# Patient Record
Sex: FEMALE | Race: WHITE | Employment: UNEMPLOYED | URBAN - NONMETROPOLITAN AREA
[De-identification: names, ages, dates, MRNs, and addresses within clinical notes are randomized per-mention and may not be internally consistent; named-entity substitution may affect disease eponyms.]

---
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
## 2024-01-24 ENCOUNTER — OFFICE VISIT (OUTPATIENT)
Age: 19
End: 2024-01-24

## 2024-01-24 VITALS
TEMPERATURE: 97.7 F | DIASTOLIC BLOOD PRESSURE: 58 MMHG | BODY MASS INDEX: 22.31 KG/M2 | WEIGHT: 130 LBS | HEART RATE: 73 BPM | OXYGEN SATURATION: 99 % | SYSTOLIC BLOOD PRESSURE: 117 MMHG

## 2024-01-24 DIAGNOSIS — Z02.1 PHYSICAL EXAM, PRE-EMPLOYMENT: Primary | ICD-10-CM

## 2024-01-24 PROCEDURE — 99999 PR OFFICE/OUTPT VISIT,PROCEDURE ONLY: CPT | Performed by: NURSE PRACTITIONER

## 2024-01-24 NOTE — PROGRESS NOTES
[facility] PHYSICIANS [facility], [facility]  MARRY QUIROZ [address] [id number]  Dept: [phone]    HPI:   Pre-participation exam for care giver at [facility] - no complaints. No medications; vaccines are up to date per patient.     No current outpatient medications on file.     No current facility-administered medications for this visit.     Allergies   Allergen Reactions    Penicillins        PAST MEDICAL HISTORY   No past medical history on file.    SURGICAL HISTORY        Procedure Laterality Date    BRAIN SURGERY      sinus tumor       FAMILY HISTORY    No family history on file.      Review of Systems:  Respiratory: Negative for shortness of breath or wheezing.   Cardiovascular: Negative for chest pain or palpitations.   Musculoskeletal: Negative for back pain, joint swelling and arthralgias.   Neurological: Negative for headaches. No history of concussion.    No history of SOB/CP/dizziness with activity. No fainting with activity. No family history of sudden death or heart attack before age 55.        Objective:      BP (!) 117/58 (Site: Right Upper Arm, Position: Sitting)   Pulse 73   Temp 97.7 °F (36.5 °C)   Wt 59 kg (130 lb)   SpO2 99%   BMI 22.31 kg/m²     Physical Exam:   Constitutional: Alejandra Alberts appears well-developed and well-nourished.  TM's: normal bilaterally  Nose: No nasal discharge.   Mouth/Throat: Mucous membranes are moist. Oropharynx is clear. Pharynx is normal.   Eyes: EOM are normal. Pupils are equal, round, and reactive to light.   Neck: Thyroid normal. No adenopathy.   Cardiovascular: Regular rhythm, nl S1 and S2. No murmur heard. Pulses symmetric.  Pulmonary/Chest: Breath sounds normal, no wheeze.   Abdomen: No mass or tenderness. BS normal.  Spine: No scoliosis.   Musc: 5/5 strength in UE and LE bilaterally; duck walk normal. Toe walking and heel walking normal.    Assessment:      Normal physical

## 2024-01-29 ENCOUNTER — HOSPITAL ENCOUNTER (EMERGENCY)
Age: 19
Discharge: HOME | End: 2024-01-29
Attending: STUDENT IN AN ORGANIZED HEALTH CARE EDUCATION/TRAINING PROGRAM
Payer: COMMERCIAL

## 2024-01-29 VITALS
OXYGEN SATURATION: 99 % | SYSTOLIC BLOOD PRESSURE: 107 MMHG | DIASTOLIC BLOOD PRESSURE: 53 MMHG | TEMPERATURE: 97.4 F | HEIGHT: 64 IN | WEIGHT: 128 LBS | RESPIRATION RATE: 16 BRPM | BODY MASS INDEX: 21.85 KG/M2 | HEART RATE: 90 BPM

## 2024-01-29 DIAGNOSIS — R07.89 ATYPICAL CHEST PAIN: Primary | ICD-10-CM

## 2024-01-29 LAB
ANION GAP SERPL CALCULATED.3IONS-SCNC: 11 MMOL/L (ref 9–17)
BASOPHILS # BLD: 0.05 K/UL (ref 0–0.2)
BASOPHILS NFR BLD: 0 % (ref 0–2)
BUN SERPL-MCNC: 15 MG/DL (ref 6–20)
BUN/CREAT SERPL: 19 (ref 9–20)
CALCIUM SERPL-MCNC: 9.5 MG/DL (ref 8.6–10.4)
CHLORIDE SERPL-SCNC: 101 MMOL/L (ref 98–107)
CK SERPL-CCNC: 172 U/L (ref 26–192)
CO2 SERPL-SCNC: 26 MMOL/L (ref 20–31)
CREAT SERPL-MCNC: 0.8 MG/DL (ref 0.5–0.9)
EKG ATRIAL RATE: 90 BPM
EKG P AXIS: 57 DEGREES
EKG P-R INTERVAL: 124 MS
EKG Q-T INTERVAL: 350 MS
EKG QRS DURATION: 84 MS
EKG QTC CALCULATION (BAZETT): 428 MS
EKG R AXIS: 62 DEGREES
EKG T AXIS: 32 DEGREES
EKG VENTRICULAR RATE: 90 BPM
EOSINOPHIL # BLD: 0.03 K/UL (ref 0–0.44)
EOSINOPHILS RELATIVE PERCENT: 0 % (ref 1–4)
ERYTHROCYTE [DISTWIDTH] IN BLOOD BY AUTOMATED COUNT: 12.6 % (ref 11.8–14.4)
GFR SERPL CREATININE-BSD FRML MDRD: >60 ML/MIN/1.73M2
GLUCOSE SERPL-MCNC: 94 MG/DL (ref 70–99)
HCT VFR BLD AUTO: 36 % (ref 36.3–47.1)
HGB BLD-MCNC: 12.2 G/DL (ref 11.9–15.1)
IMM GRANULOCYTES # BLD AUTO: 0.05 K/UL (ref 0–0.3)
IMM GRANULOCYTES NFR BLD: 0 %
LYMPHOCYTES NFR BLD: 2.87 K/UL (ref 1.2–5.2)
LYMPHOCYTES RELATIVE PERCENT: 24 % (ref 25–45)
MCH RBC QN AUTO: 30.9 PG (ref 25.2–33.5)
MCHC RBC AUTO-ENTMCNC: 33.9 G/DL (ref 28.4–34.8)
MCV RBC AUTO: 91.1 FL (ref 82.6–102.9)
MONOCYTES NFR BLD: 0.89 K/UL (ref 0.1–1.4)
MONOCYTES NFR BLD: 7 % (ref 2–8)
MYOGLOBIN SERPL-MCNC: 91 NG/ML (ref 25–58)
NEUTROPHILS NFR BLD: 69 % (ref 34–64)
NEUTS SEG NFR BLD: 8.14 K/UL (ref 1.8–8)
NRBC BLD-RTO: 0 PER 100 WBC
PLATELET # BLD AUTO: 273 K/UL (ref 138–453)
PMV BLD AUTO: 9.7 FL (ref 8.1–13.5)
POTASSIUM SERPL-SCNC: 4.1 MMOL/L (ref 3.7–5.3)
RBC # BLD AUTO: 3.95 M/UL (ref 3.95–5.11)
SODIUM SERPL-SCNC: 138 MMOL/L (ref 135–144)
TROPONIN I SERPL HS-MCNC: 8 NG/L (ref 0–14)
WBC OTHER # BLD: 12 K/UL (ref 4.5–13.5)

## 2024-01-29 PROCEDURE — 99284 EMERGENCY DEPT VISIT MOD MDM: CPT

## 2024-01-29 PROCEDURE — 93005 ELECTROCARDIOGRAM TRACING: CPT | Performed by: EMERGENCY MEDICINE

## 2024-01-29 PROCEDURE — 93010 ELECTROCARDIOGRAM REPORT: CPT | Performed by: INTERNAL MEDICINE

## 2024-01-29 PROCEDURE — 85025 COMPLETE CBC W/AUTO DIFF WBC: CPT

## 2024-01-29 PROCEDURE — 82550 ASSAY OF CK (CPK): CPT

## 2024-01-29 PROCEDURE — 84484 ASSAY OF TROPONIN QUANT: CPT

## 2024-01-29 PROCEDURE — 36415 COLL VENOUS BLD VENIPUNCTURE: CPT

## 2024-01-29 PROCEDURE — 80048 BASIC METABOLIC PNL TOTAL CA: CPT

## 2024-01-29 PROCEDURE — 83874 ASSAY OF MYOGLOBIN: CPT

## 2024-01-29 ASSESSMENT — ENCOUNTER SYMPTOMS
CHOKING: 0
BACK PAIN: 0
NAUSEA: 0
VOMITING: 0
EYES NEGATIVE: 1
CHEST TIGHTNESS: 1
WHEEZING: 0
ABDOMINAL DISTENTION: 0
COUGH: 0

## 2024-01-29 ASSESSMENT — PAIN - FUNCTIONAL ASSESSMENT: PAIN_FUNCTIONAL_ASSESSMENT: NONE - DENIES PAIN

## 2024-01-29 ASSESSMENT — LIFESTYLE VARIABLES
HOW OFTEN DO YOU HAVE A DRINK CONTAINING ALCOHOL: NEVER
HOW MANY STANDARD DRINKS CONTAINING ALCOHOL DO YOU HAVE ON A TYPICAL DAY: PATIENT DOES NOT DRINK

## 2024-01-30 ENCOUNTER — TELEPHONE (OUTPATIENT)
Dept: CARDIOLOGY | Age: 19
End: 2024-01-30

## 2024-01-30 NOTE — TELEPHONE ENCOUNTER
----- Message from Danielle Ortiz MD sent at 1/29/2024 10:40 PM EST -----        ----- Message -----  From: Bryson Butts MD  Sent: 1/29/2024   8:49 PM EST  To: Danielle Ortiz MD

## 2024-01-30 NOTE — PROGRESS NOTES
Cardiology follow up requested by the ER team.   Angie, can you please call the patient to schedule an appointment? Thank you

## 2024-01-30 NOTE — ED PROVIDER NOTES
[facility] ED  Emergency Department Encounter  Emergency Medicine Attending     Pt Name:Alejandra Alberts  MRN: [id number]  Birthdate 2005  Date of evaluation: 1/29/24  PCP:  Not, On File (Inactive)  Note Started: 7:27 PM EST      CHIEF COMPLAINT       Chief Complaint   Patient presents with    Chest Pain     Intermittent left side chest pain over last 2 weeks, denies current chest pain. Pt states she feels shakiness, palpitations, bilateral leg pain and skin discoloration. States can not get into PCP and wants evaluated by ER with referral to cardiology.        HISTORY OF PRESENT ILLNESS  (Location/Symptom, Timing/Onset, Context/Setting, Quality, Duration, Modifying Factors, Severity.)      Alejandra Alberts is a 18 y.o. female who presents with several different complaints that have all been proceeding experience over the past 2 weeks.  Patient states that she has been having occasional intermittent left-sided chest discomfort that only occasionally turns into a sharp stabbing-like sensation.  Patient was also complained about palpitations, and extremely high heart rate during her workouts as well getting up over 200.  Patient states that she is also been having some intermittent aches in her lower extremities bilaterally and seems that everything seems to be worse in the morning especially after she recovers from her daily work.  Patient denies any nausea, vomiting, shortness of breath, changes in her mentation, changes in her GI, , changes in her diet or any form of recreational or illicit drug use.    PAST MEDICAL / SURGICAL / SOCIAL / FAMILY HISTORY      has no past medical history on file.       has a past surgical history that includes brain surgery.      Social History     Socioeconomic History    Marital status: Single     Spouse name: Not on file    Number of children: Not on file    Years of education: Not on file    Highest education level: Not on file   Occupational History    Not on file

## 2024-02-05 ENCOUNTER — HOSPITAL ENCOUNTER (EMERGENCY)
Age: 19
Discharge: HOME | End: 2024-02-05
Attending: EMERGENCY MEDICINE
Payer: COMMERCIAL

## 2024-02-05 VITALS
OXYGEN SATURATION: 98 % | DIASTOLIC BLOOD PRESSURE: 77 MMHG | SYSTOLIC BLOOD PRESSURE: 127 MMHG | RESPIRATION RATE: 16 BRPM | HEART RATE: 69 BPM | TEMPERATURE: 98.6 F

## 2024-02-05 DIAGNOSIS — Z01.89 VISIT FOR LABORATORY TEST: Primary | ICD-10-CM

## 2024-02-05 LAB — MYOGLOBIN SERPL-MCNC: <21 NG/ML (ref 25–58)

## 2024-02-05 PROCEDURE — 36415 COLL VENOUS BLD VENIPUNCTURE: CPT

## 2024-02-05 PROCEDURE — 99283 EMERGENCY DEPT VISIT LOW MDM: CPT

## 2024-02-05 PROCEDURE — 83874 ASSAY OF MYOGLOBIN: CPT

## 2024-02-05 ASSESSMENT — ENCOUNTER SYMPTOMS
SHORTNESS OF BREATH: 0
SORE THROAT: 0
ABDOMINAL DISTENTION: 0
BACK PAIN: 0

## 2024-02-05 ASSESSMENT — LIFESTYLE VARIABLES
HOW MANY STANDARD DRINKS CONTAINING ALCOHOL DO YOU HAVE ON A TYPICAL DAY: PATIENT DOES NOT DRINK
HOW OFTEN DO YOU HAVE A DRINK CONTAINING ALCOHOL: NEVER

## 2024-02-05 ASSESSMENT — PAIN - FUNCTIONAL ASSESSMENT: PAIN_FUNCTIONAL_ASSESSMENT: NONE - DENIES PAIN

## 2024-02-05 NOTE — ED PROVIDER NOTES
[facility] ED  EMERGENCY DEPARTMENT ENCOUNTER      Pt Name: Alejandra Alberts  MRN: [id number]  Birthdate 2005  Date of evaluation: 2/5/2024  Provider: Tabitha Jeronimo DO    CHIEF COMPLAINT       Chief Complaint   Patient presents with    Abnormal Lab     Pt. 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          HISTORY OF PRESENT ILLNESS   (Location/Symptom, Timing/Onset, Context/Setting, Quality, Duration, Modifying Factors, Severity)  Note limiting factors.   Alejandra Alberts is a 18 y.o. female who presents to the emergency department for repeat of her lab work.  Patient was seen in the emergency department 6 days ago for chest pain and had symptoms concerning with myalgias so her hemoglobin was checked and it was 91.  She is here today requesting repeat hemoglobin level as her  would not allow her to go back to track without having it rechecked.  She states that she went to her doctor's office but they were unable to order an outpatient myoglobin test for her.  Therefore she is here in the emergency department to get it drawn.  Patient otherwise reports no symptoms and seems to be doing great.  Patient states that she feels back to baseline and would like to return to practice but her  would like to have her myoglobin levels repeated before they allow her back to practice.    REVIEW OF SYSTEMS    (2-9 systems for level 4, 10 or more for level 5)     Review of Systems   Constitutional:  Negative for fatigue and fever.   HENT:  Negative for congestion and sore throat.    Eyes:  Negative for visual disturbance.   Respiratory:  Negative for shortness of breath.    Cardiovascular:  Negative for chest pain and palpitations.   Gastrointestinal:  Negative for abdominal distention.   Genitourinary:  Negative for dysuria.   Musculoskeletal:  Negative for back pain.   Skin:  Negative for rash.   Psychiatric/Behavioral:  Negative for suicidal ideas.

## 2024-02-05 NOTE — DISCHARGE INSTRUCTIONS
Make sure to drink plenty of fluids.  Slowly step into your practice and advance as your body can tolerate.

## 2024-05-01 ENCOUNTER — HOSPITAL ENCOUNTER (EMERGENCY)
Age: 19
Discharge: HOME | End: 2024-05-01
Attending: EMERGENCY MEDICINE
Payer: COMMERCIAL

## 2024-05-01 VITALS
DIASTOLIC BLOOD PRESSURE: 84 MMHG | HEART RATE: 67 BPM | RESPIRATION RATE: 16 BRPM | OXYGEN SATURATION: 95 % | TEMPERATURE: 97.1 F | SYSTOLIC BLOOD PRESSURE: 124 MMHG

## 2024-05-01 DIAGNOSIS — K08.89 PAIN, DENTAL: Primary | ICD-10-CM

## 2024-05-01 PROCEDURE — 99282 EMERGENCY DEPT VISIT SF MDM: CPT

## 2024-05-01 RX ORDER — ACETAMINOPHEN 500 MG
1000 TABLET ORAL EVERY 6 HOURS PRN
COMMUNITY

## 2024-05-01 RX ORDER — CLINDAMYCIN HYDROCHLORIDE 300 MG/1
300 CAPSULE ORAL 3 TIMES DAILY
COMMUNITY

## 2024-05-01 RX ORDER — IBUPROFEN 200 MG
1000 TABLET ORAL EVERY 6 HOURS PRN
COMMUNITY

## 2024-05-01 ASSESSMENT — PAIN DESCRIPTION - DESCRIPTORS: DESCRIPTORS: ACHING

## 2024-05-01 ASSESSMENT — PAIN - FUNCTIONAL ASSESSMENT: PAIN_FUNCTIONAL_ASSESSMENT: 0-10

## 2024-05-01 ASSESSMENT — PAIN DESCRIPTION - LOCATION: LOCATION: TEETH

## 2024-05-01 ASSESSMENT — PAIN SCALES - GENERAL: PAINLEVEL_OUTOF10: 8

## 2024-05-01 ASSESSMENT — PAIN DESCRIPTION - PAIN TYPE: TYPE: ACUTE PAIN

## 2024-05-01 ASSESSMENT — PAIN DESCRIPTION - FREQUENCY: FREQUENCY: CONTINUOUS

## 2024-05-01 ASSESSMENT — PAIN DESCRIPTION - ORIENTATION: ORIENTATION: RIGHT;LOWER

## 2024-05-01 NOTE — ED PROVIDER NOTES
[facility]  EMERGENCY DEPARTMENT ENCOUNTER      Pt Name: Alejandra Alberts  MRN: [id number]  Birthdate 2005  Date of evaluation: 5/1/2024  Provider: Raghav Marshall MD    CHIEF COMPLAINT       Chief Complaint   Patient presents with    Dental Pain     Patient states she had a dental infection back in February and she was put on Clindamycin, states she completed the atbx and a few weeks later the pain came back. Stats her PCP called in Clindamycin for her and she started that three days ago but it is not helping the pain. States she cannot see her dentist again until May 21st, states she is here for something for the pain         HISTORY OF PRESENT ILLNESS      Alejandra Alberts is a 18 y.o. female who presents to the emergency department for eval ration of a tooth ache.  States that she was started on clindamycin for a presumed infection in the same tooth in the right lower jaw in February of this year.  Symptoms improved, but now have recurred.  Has been worsening over the past few days.  PCP started her on clindamycin 3 days ago but she notes progressive pain in the right lower jaw at the site of that tooth.  No fever.  No difficulty breathing or swallowing.  No other complaints.    States that she has an appointment with her dentist in [place] on 5/21 but has not been able to establish an appointment locally prior to that.    PAST MEDICAL HISTORY     None noted      SURGICAL HISTORY       Past Surgical History:   Procedure Laterality Date    BRAIN SURGERY      sinus tumor         CURRENT MEDICATIONS       Previous Medications    ACETAMINOPHEN (TYLENOL) 500 MG TABLET    Take 2 tablets by mouth every 6 hours as needed for Pain    CLINDAMYCIN (CLEOCIN) 300 MG CAPSULE    Take 1 capsule by mouth 3 times daily    IBUPROFEN (ADVIL;MOTRIN) 200 MG TABLET    Take 5 tablets by mouth every 6 hours as needed for Pain       ALLERGIES       Penicillins    FAMILY HISTORY       History reviewed. No pertinent family history.

## 2024-05-01 NOTE — DISCHARGE INSTRUCTIONS
Follow-up with the dentist as directed.    Return to the ED for increasing pain, facial swelling, fever, difficulty breathing or swallowing, or any other concerns.

## 2024-09-26 ENCOUNTER — HOSPITAL ENCOUNTER (EMERGENCY)
Age: 19
Discharge: HOME | End: 2024-09-26
Payer: COMMERCIAL

## 2024-09-26 ENCOUNTER — APPOINTMENT (OUTPATIENT)
Age: 19
End: 2024-09-26
Payer: COMMERCIAL

## 2024-09-26 ENCOUNTER — APPOINTMENT (OUTPATIENT)
Dept: CT IMAGING | Age: 19
End: 2024-09-26
Payer: COMMERCIAL

## 2024-09-26 VITALS
SYSTOLIC BLOOD PRESSURE: 109 MMHG | HEART RATE: 55 BPM | RESPIRATION RATE: 16 BRPM | DIASTOLIC BLOOD PRESSURE: 61 MMHG | WEIGHT: 120 LBS | BODY MASS INDEX: 20.6 KG/M2 | OXYGEN SATURATION: 99 % | TEMPERATURE: 98.1 F

## 2024-09-26 DIAGNOSIS — R00.1 BRADYCARDIA: Primary | ICD-10-CM

## 2024-09-26 DIAGNOSIS — R42 LIGHTHEADEDNESS: ICD-10-CM

## 2024-09-26 LAB
ALBUMIN SERPL-MCNC: 4.5 G/DL (ref 3.5–5.2)
ALBUMIN/GLOB SERPL: 1.7 {RATIO} (ref 1–2.5)
ALP SERPL-CCNC: 59 U/L (ref 35–104)
ALT SERPL-CCNC: 25 U/L (ref 10–35)
AMORPH SED URNS QL MICRO: ABNORMAL
ANION GAP SERPL CALCULATED.3IONS-SCNC: 9 MMOL/L (ref 9–16)
AST SERPL-CCNC: 27 U/L (ref 10–35)
BACTERIA URNS QL MICRO: ABNORMAL
BASOPHILS # BLD: 0.04 K/UL (ref 0–0.2)
BASOPHILS NFR BLD: 1 % (ref 0–2)
BILIRUB SERPL-MCNC: 0.3 MG/DL (ref 0–1.2)
BILIRUB UR QL STRIP: NEGATIVE
BUN SERPL-MCNC: 8 MG/DL (ref 6–20)
BUN/CREAT SERPL: 10 (ref 9–20)
CALCIUM SERPL-MCNC: 9.7 MG/DL (ref 8.6–10.4)
CHLORIDE SERPL-SCNC: 105 MMOL/L (ref 98–107)
CK SERPL-CCNC: 79 U/L (ref 26–192)
CLARITY UR: CLEAR
CO2 SERPL-SCNC: 26 MMOL/L (ref 20–31)
COLOR UR: YELLOW
CREAT SERPL-MCNC: 0.8 MG/DL (ref 0.5–0.9)
CRP SERPL HS-MCNC: <3 MG/L (ref 0–5)
EOSINOPHIL # BLD: 0.06 K/UL (ref 0–0.44)
EOSINOPHILS RELATIVE PERCENT: 1 % (ref 1–4)
EPI CELLS #/AREA URNS HPF: ABNORMAL /HPF (ref 0–25)
ERYTHROCYTE [DISTWIDTH] IN BLOOD BY AUTOMATED COUNT: 12.9 % (ref 11.8–14.4)
GFR, ESTIMATED: >90 ML/MIN/1.73M2
GLUCOSE SERPL-MCNC: 71 MG/DL (ref 74–99)
GLUCOSE UR STRIP-MCNC: NEGATIVE MG/DL
HCG UR QL: NEGATIVE
HCT VFR BLD AUTO: 41.1 % (ref 36.3–47.1)
HGB BLD-MCNC: 13.9 G/DL (ref 11.9–15.1)
HGB UR QL STRIP.AUTO: NEGATIVE
IMM GRANULOCYTES # BLD AUTO: <0.03 K/UL (ref 0–0.3)
IMM GRANULOCYTES NFR BLD: 0 %
KETONES UR STRIP-MCNC: NEGATIVE MG/DL
LEUKOCYTE ESTERASE UR QL STRIP: NEGATIVE
LYMPHOCYTES NFR BLD: 2.17 K/UL (ref 1.2–5.2)
LYMPHOCYTES RELATIVE PERCENT: 36 % (ref 25–45)
MAGNESIUM SERPL-MCNC: 2 MG/DL (ref 1.6–2.6)
MCH RBC QN AUTO: 31.4 PG (ref 25.2–33.5)
MCHC RBC AUTO-ENTMCNC: 33.8 G/DL (ref 28.4–34.8)
MCV RBC AUTO: 92.8 FL (ref 82.6–102.9)
MONOCYTES NFR BLD: 0.37 K/UL (ref 0.1–1.4)
MONOCYTES NFR BLD: 6 % (ref 2–8)
MUCOUS THREADS URNS QL MICRO: ABNORMAL
NEUTROPHILS NFR BLD: 56 % (ref 34–64)
NEUTS SEG NFR BLD: 3.32 K/UL (ref 1.8–8)
NITRITE UR QL STRIP: NEGATIVE
NRBC BLD-RTO: 0 PER 100 WBC
PH UR STRIP: 7 [PH] (ref 5–9)
PLATELET # BLD AUTO: 285 K/UL (ref 138–453)
PMV BLD AUTO: 9.8 FL (ref 8.1–13.5)
POTASSIUM SERPL-SCNC: 4.2 MMOL/L (ref 3.7–5.3)
PROT SERPL-MCNC: 7.1 G/DL (ref 6.6–8.7)
PROT UR STRIP-MCNC: NEGATIVE MG/DL
RBC # BLD AUTO: 4.43 M/UL (ref 3.95–5.11)
RBC #/AREA URNS HPF: ABNORMAL /HPF (ref 0–2)
SODIUM SERPL-SCNC: 140 MMOL/L (ref 136–145)
SP GR UR STRIP: 1.02 (ref 1.01–1.02)
TROPONIN I SERPL HS-MCNC: <6 NG/L (ref 0–14)
UROBILINOGEN UR STRIP-ACNC: NORMAL EU/DL (ref 0–1)
WBC #/AREA URNS HPF: ABNORMAL /HPF (ref 0–5)
WBC OTHER # BLD: 6 K/UL (ref 4.5–13.5)

## 2024-09-26 PROCEDURE — 84484 ASSAY OF TROPONIN QUANT: CPT

## 2024-09-26 PROCEDURE — 2580000003 HC RX 258: Performed by: NURSE PRACTITIONER

## 2024-09-26 PROCEDURE — 99284 EMERGENCY DEPT VISIT MOD MDM: CPT

## 2024-09-26 PROCEDURE — 93005 ELECTROCARDIOGRAM TRACING: CPT | Performed by: NURSE PRACTITIONER

## 2024-09-26 PROCEDURE — 83735 ASSAY OF MAGNESIUM: CPT

## 2024-09-26 PROCEDURE — 81001 URINALYSIS AUTO W/SCOPE: CPT

## 2024-09-26 PROCEDURE — 70450 CT HEAD/BRAIN W/O DYE: CPT

## 2024-09-26 PROCEDURE — 82746 ASSAY OF FOLIC ACID SERUM: CPT

## 2024-09-26 PROCEDURE — 81025 URINE PREGNANCY TEST: CPT

## 2024-09-26 PROCEDURE — 85025 COMPLETE CBC W/AUTO DIFF WBC: CPT

## 2024-09-26 PROCEDURE — 82550 ASSAY OF CK (CPK): CPT

## 2024-09-26 PROCEDURE — 86140 C-REACTIVE PROTEIN: CPT

## 2024-09-26 PROCEDURE — 80053 COMPREHEN METABOLIC PANEL: CPT

## 2024-09-26 PROCEDURE — 87086 URINE CULTURE/COLONY COUNT: CPT

## 2024-09-26 PROCEDURE — 82607 VITAMIN B-12: CPT

## 2024-09-26 PROCEDURE — 93242 EXT ECG>48HR<7D RECORDING: CPT

## 2024-09-26 RX ORDER — 0.9 % SODIUM CHLORIDE 0.9 %
1000 INTRAVENOUS SOLUTION INTRAVENOUS ONCE
Status: COMPLETED | OUTPATIENT
Start: 2024-09-26 | End: 2024-09-26

## 2024-09-26 RX ADMIN — SODIUM CHLORIDE 1000 ML: 9 INJECTION, SOLUTION INTRAVENOUS at 14:05

## 2024-09-27 ENCOUNTER — TELEPHONE (OUTPATIENT)
Dept: CARDIOLOGY | Age: 19
End: 2024-09-27

## 2024-09-27 LAB
EKG ATRIAL RATE: 54 BPM
EKG P AXIS: 35 DEGREES
EKG P-R INTERVAL: 130 MS
EKG Q-T INTERVAL: 410 MS
EKG QRS DURATION: 86 MS
EKG QTC CALCULATION (BAZETT): 388 MS
EKG R AXIS: 83 DEGREES
EKG T AXIS: 45 DEGREES
EKG VENTRICULAR RATE: 54 BPM
FOLATE SERPL-MCNC: 14.1 NG/ML (ref 4.8–24.2)
MICROORGANISM SPEC CULT: NO GROWTH
SERVICE CMNT-IMP: NORMAL
SPECIMEN DESCRIPTION: NORMAL
VIT B12 SERPL-MCNC: 717 PG/ML (ref 232–1245)

## 2024-09-27 PROCEDURE — 93010 ELECTROCARDIOGRAM REPORT: CPT | Performed by: FAMILY MEDICINE

## 2024-10-01 ENCOUNTER — OFFICE VISIT (OUTPATIENT)
Age: 19
End: 2024-10-01

## 2024-10-01 VITALS
HEART RATE: 60 BPM | SYSTOLIC BLOOD PRESSURE: 114 MMHG | DIASTOLIC BLOOD PRESSURE: 60 MMHG | OXYGEN SATURATION: 98 % | TEMPERATURE: 97.8 F

## 2024-10-01 DIAGNOSIS — L23.1 ALLERGIC CONTACT DERMATITIS DUE TO ADHESIVES: Primary | ICD-10-CM

## 2024-10-01 ASSESSMENT — ENCOUNTER SYMPTOMS
RESPIRATORY NEGATIVE: 1
EYES NEGATIVE: 1
GASTROINTESTINAL NEGATIVE: 1

## 2024-10-01 NOTE — PROGRESS NOTES
[facility] PHYSICIANS [facility]  MARRY QUIROZ [address] [place], [place] [id number]  Dept: [phone]  Dept Fax: [phone]    Alejandra Alberts is a 19 y.o. femalewho presents to the [facility] today for c/o of Other (Irritant from band aid )      HPI:     19-year-old female presents to [facility] today with complaints of a rash due to a Band-Aid that she applied to her chest area.  She denies any, shortness of breath, difficulty breathing, facial swelling, lip swelling, tongue swelling or trouble swallowing.  She has not taken anything for her symptoms        Past Medical History:   Diagnosis Date    Rhabdomyolysis 2024       Current Outpatient Medications   Medication Sig Dispense Refill    Ferrous Sulfate Dried (FERROUS SULFATE IRON PO) Take 1 tablet by mouth daily      clindamycin (CLEOCIN) 300 MG capsule Take 1 capsule by mouth 3 times daily (Patient not taking: Reported on 9/26/2024)      ibuprofen (ADVIL;MOTRIN) 200 MG tablet Take 5 tablets by mouth every 6 hours as needed for Pain      acetaminophen (TYLENOL) 500 MG tablet Take 2 tablets by mouth every 6 hours as needed for Pain       No current facility-administered medications for this visit.     Allergies   Allergen Reactions    Penicillins     Wound Dressing Adhesive      Band aid        :     Review of Systems   Constitutional: Negative.    HENT: Negative.     Eyes: Negative.    Respiratory: Negative.     Cardiovascular: Negative.    Gastrointestinal: Negative.    Genitourinary: Negative.    Musculoskeletal: Negative.    Skin:  Positive for rash.        From a band aid on chest    Neurological: Negative.    Psychiatric/Behavioral: Negative.         :     Physical Exam  Vitals and nursing note reviewed.   Constitutional:       General: She is not in acute distress.     Appearance: She is well-developed. She is not ill-appearing or toxic-appearing.

## 2024-10-17 ENCOUNTER — OFFICE VISIT (OUTPATIENT)
Dept: CARDIOLOGY | Age: 19
End: 2024-10-17
Payer: COMMERCIAL

## 2024-10-17 VITALS
WEIGHT: 121 LBS | HEART RATE: 94 BPM | OXYGEN SATURATION: 95 % | SYSTOLIC BLOOD PRESSURE: 126 MMHG | DIASTOLIC BLOOD PRESSURE: 71 MMHG | HEIGHT: 64 IN | RESPIRATION RATE: 18 BRPM | BODY MASS INDEX: 20.66 KG/M2

## 2024-10-17 DIAGNOSIS — R00.2 PALPITATIONS: ICD-10-CM

## 2024-10-17 DIAGNOSIS — R42 DIZZINESS: ICD-10-CM

## 2024-10-17 DIAGNOSIS — R07.9 CHEST PAIN, UNSPECIFIED TYPE: ICD-10-CM

## 2024-10-17 DIAGNOSIS — R55 SYNCOPE, UNSPECIFIED SYNCOPE TYPE: ICD-10-CM

## 2024-10-17 DIAGNOSIS — R42 LIGHT HEADEDNESS: ICD-10-CM

## 2024-10-17 DIAGNOSIS — R06.02 SHORTNESS OF BREATH: ICD-10-CM

## 2024-10-17 DIAGNOSIS — R00.1 BRADYCARDIA: ICD-10-CM

## 2024-10-17 PROCEDURE — 99211 OFF/OP EST MAY X REQ PHY/QHP: CPT | Performed by: PHYSICIAN ASSISTANT

## 2024-10-17 PROCEDURE — 99204 OFFICE O/P NEW MOD 45 MIN: CPT | Performed by: PHYSICIAN ASSISTANT

## 2024-10-17 NOTE — PROGRESS NOTES
I, Soraya Durant am scribing for and in the presence of Brenda Colbert PA-C.    Patient: Alejandra Alberts  : 2005  Date of Visit: 2024    REASON FOR VISIT / CONSULTATION: Dizziness (Pt is here for a ED follow up from 24 for light headed/dizziness and bradycardia. Pt is doing okay. She said her symptoms have gotten better but the light headedness comes and goes with certain things. She is a runner and runs about 50 miles a week and she did run once yesterday and felt pretty good but did notice her heart rate was 210. She said it also took a long time to slow down. She said it was still 185 for about 30 minutes and then it dropped to 50 and she felt really light headed since. ) and Loss of Consciousness (She said she also said she was very close to passing out while she was just sitting down and painting. She said she was seeing stars and feeling  very light headed. She said she also has issues sleeping and she wakes up like she can't breathe and trying to cath her breath. Some chest pain a little bit that is really random about 3-4 times, gets a little short of breath and heart racing as well. Feel sharp and lasts seconds. )      History of Present Illness:        Dear On File Not (Inactive),     I had the pleasure of seeing  Alejandra Alberts in my office today. Ms. Alberts is a 19 y.o. female with a recent history of intermittent lightheadedness and dizziness.    Ms. Alberts denies any past cardiac history. She has never been a smoker. She said her grandmother has a history of a rhythm issue.    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  She had rhabdomyolysis this past year because she was not eating due to not having an appetite. She also came to the emergency room on 24 for light headed/dizziness and bradycardia.     CAM done 10/2/2024: 3 days and 7 hours recorded. Baseline rhythm is sinus with an average heart rate of 68 bpm, ranging between 40 and 164

## 2024-10-17 NOTE — PROGRESS NOTES
I, {[facility] MAs:[id number]} am scribing for and in the presence of {[facility] Doctors:[id number]::\"Kumar Narvaez MD, MS, F.A.CVeronicaCVeronica\"}.    Patient: Alejandra Alberts  : 2005  Date of Visit: 2024    REASON FOR VISIT / CONSULTATION: Dizziness (Pt is here for a ED follow up from 24 for light headed/dizziness and bradycardia. Pt is doing okay. She said her symptoms have gotten better but the light headedness comes and goes with certain things. She is a runner and runs about 50 miles a week and she did run once yesterday and felt pretty good but did notice her heart rate was 210. She said it also took a long time to slow down. She said it was still 185 for about 30 minutes and then it dropped to 50 and she felt really light headed since. ) and Loss of Consciousness (She said she also said she was very close to passing out while she was just sitting down and painting. She said she was seeing stars and feeling  very light headed. She said she also has issues sleeping and she wakes up like she can't breathe and trying to cath her breath. Some chest pain a little bit that is really random about 3-4 times, gets a little short of breath and heart racing as well. Feel sharp and lasts seconds. )      History of Present Illness:        Dear On File Not (Inactive),     I had the pleasure of seeing  Alejandra Alberts in my office today. Ms. Alberts is a 19 y.o. female with a recent history of intermittent lightheadedness and dizziness.    Ms. Alberts denies any past cardiac history. She has never been a smoker. She denies any family history of cardiac conditions.    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  She also has a history of rhabdomyolysis.    CAM done 10/2/2024: 3 days and 7 hours recorded. Baseline rhythm is sinus with an average heart rate of 68 bpm, ranging between 40 and 164 bpm. Sinus tachycardia represented 4% of the study duration. No severe bradycardia or pauses. Rare isolated PVCs

## 2024-10-18 ENCOUNTER — HOSPITAL ENCOUNTER (OUTPATIENT)
Age: 19
Discharge: HOME | End: 2024-10-20
Payer: COMMERCIAL

## 2024-10-18 VITALS
WEIGHT: 121.03 LBS | HEIGHT: 64 IN | SYSTOLIC BLOOD PRESSURE: 126 MMHG | BODY MASS INDEX: 20.66 KG/M2 | DIASTOLIC BLOOD PRESSURE: 71 MMHG

## 2024-10-18 DIAGNOSIS — R06.02 SHORTNESS OF BREATH: ICD-10-CM

## 2024-10-18 DIAGNOSIS — R07.9 CHEST PAIN, UNSPECIFIED TYPE: ICD-10-CM

## 2024-10-18 LAB
ECHO AO ROOT DIAM: 1.8 CM
ECHO AO ROOT INDEX: 1.14 CM/M2
ECHO AO SINUS VALSALVA DIAM: 2.8 CM
ECHO AO SINUS VALSALVA INDEX: 1.77 CM/M2
ECHO AO ST JNCT DIAM: 1.9 CM
ECHO AV CUSP MM: 1.6 CM
ECHO AV MEAN GRADIENT: 4 MMHG
ECHO AV MEAN VELOCITY: 0.9 M/S
ECHO AV PEAK GRADIENT: 6 MMHG
ECHO AV PEAK VELOCITY: 1.3 M/S
ECHO AV VELOCITY RATIO: 0.85
ECHO AV VTI: 27.7 CM
ECHO BSA: 1.57 M2
ECHO BSA: 1.57 M2
ECHO EST RA PRESSURE: 3 MMHG
ECHO LA AREA 2C: 12.8 CM2
ECHO LA AREA 4C: 16.6 CM2
ECHO LA MAJOR AXIS: 4.7 CM
ECHO LA MINOR AXIS: 4.3 CM
ECHO LA VOL BP: 40 ML (ref 22–52)
ECHO LA VOL MOD A2C: 32 ML (ref 22–52)
ECHO LA VOL MOD A4C: 45 ML (ref 22–52)
ECHO LA VOL/BSA BIPLANE: 25 ML/M2 (ref 16–34)
ECHO LA VOLUME INDEX MOD A2C: 20 ML/M2 (ref 16–34)
ECHO LA VOLUME INDEX MOD A4C: 28 ML/M2 (ref 16–34)
ECHO LV E' LATERAL VELOCITY: 13.4 CM/S
ECHO LV EDV A2C: 62 ML
ECHO LV EDV A4C: 60 ML
ECHO LV EDV INDEX A4C: 38 ML/M2
ECHO LV EDV NDEX A2C: 39 ML/M2
ECHO LV EJECTION FRACTION A2C: 65 %
ECHO LV EJECTION FRACTION A4C: 63 %
ECHO LV EJECTION FRACTION BIPLANE: 64 % (ref 55–100)
ECHO LV ESV A2C: 22 ML
ECHO LV ESV A4C: 22 ML
ECHO LV ESV INDEX A2C: 14 ML/M2
ECHO LV ESV INDEX A4C: 14 ML/M2
ECHO LV FRACTIONAL SHORTENING: 34 % (ref 28–44)
ECHO LV INTERNAL DIMENSION DIASTOLE INDEX: 2.59 CM/M2
ECHO LV INTERNAL DIMENSION DIASTOLIC: 4.1 CM (ref 3.9–5.3)
ECHO LV INTERNAL DIMENSION SYSTOLIC INDEX: 1.71 CM/M2
ECHO LV INTERNAL DIMENSION SYSTOLIC: 2.7 CM
ECHO LV IVSD: 0.7 CM (ref 0.6–0.9)
ECHO LV MASS 2D: 97.3 G (ref 67–162)
ECHO LV MASS INDEX 2D: 61.6 G/M2 (ref 43–95)
ECHO LV POSTERIOR WALL DIASTOLIC: 0.9 CM (ref 0.6–0.9)
ECHO LV RELATIVE WALL THICKNESS RATIO: 0.44
ECHO LVOT AV VTI INDEX: 0.85
ECHO LVOT MEAN GRADIENT: 3 MMHG
ECHO LVOT PEAK GRADIENT: 5 MMHG
ECHO LVOT PEAK VELOCITY: 1.1 M/S
ECHO LVOT VTI: 23.5 CM
ECHO MV A VELOCITY: 0.39 M/S
ECHO MV E DECELERATION TIME (DT): 187 MS
ECHO MV E VELOCITY: 0.94 M/S
ECHO MV E/A RATIO: 2.41
ECHO MV E/E' LATERAL: 7.01
ECHO PULMONARY ARTERY END DIASTOLIC PRESSURE: 2 MMHG
ECHO PV MAX VELOCITY: 1.1 M/S
ECHO PV PEAK GRADIENT: 5 MMHG
ECHO PV REGURGITANT MAX VELOCITY: 0.8 M/S
ECHO RIGHT VENTRICULAR SYSTOLIC PRESSURE (RVSP): 23 MMHG
ECHO TV REGURGITANT MAX VELOCITY: 2.22 M/S
ECHO TV REGURGITANT PEAK GRADIENT: 20 MMHG
STRESS BASELINE DIAS BP: 58 MMHG
STRESS BASELINE HR: 70 BPM
STRESS BASELINE SYS BP: 112 MMHG
STRESS ESTIMATED WORKLOAD: 17.2 METS
STRESS EXERCISE DUR MIN: 15 MIN
STRESS EXERCISE DUR SEC: 1 SEC
STRESS PEAK DIAS BP: 62 MMHG
STRESS PEAK SYS BP: 150 MMHG
STRESS PERCENT HR ACHIEVED: 92 %
STRESS POST PEAK HR: 184 BPM
STRESS RATE PRESSURE PRODUCT: NORMAL BPM*MMHG
STRESS TARGET HR: 200 BPM

## 2024-10-18 PROCEDURE — 93306 TTE W/DOPPLER COMPLETE: CPT

## 2024-10-18 PROCEDURE — 93306 TTE W/DOPPLER COMPLETE: CPT | Performed by: INTERNAL MEDICINE

## 2024-10-18 PROCEDURE — 93016 CV STRESS TEST SUPVJ ONLY: CPT | Performed by: INTERNAL MEDICINE

## 2024-10-18 PROCEDURE — 93018 CV STRESS TEST I&R ONLY: CPT | Performed by: INTERNAL MEDICINE

## 2024-10-18 PROCEDURE — 93017 CV STRESS TEST TRACING ONLY: CPT

## 2024-10-21 ENCOUNTER — TELEPHONE (OUTPATIENT)
Dept: CARDIOLOGY | Age: 19
End: 2024-10-21

## 2024-10-21 NOTE — RESULT ENCOUNTER NOTE
Please let them know their stress test looked good, it was low risk. Echo was also good. Will discuss at follow up. Please ask how she is feleing with increasing fluids and compression socks? Thanks.

## 2024-10-21 NOTE — TELEPHONE ENCOUNTER
Ms. Alberts called back after we called her with the results and she wants to know if you can clear her for cross country and maybe give her a note? She is feeling good. Please advise, thank you.

## 2024-10-21 NOTE — TELEPHONE ENCOUNTER
----- Message from Brenda Colbert PA-C sent at 10/21/2024  8:09 AM EDT -----  Please let them know their stress test looked good, it was low risk. Echo was also good. Will discuss at follow up. Please ask how she is feleing with increasing fluids and compression socks? Thanks.